# Patient Record
Sex: FEMALE | Race: WHITE | NOT HISPANIC OR LATINO | Employment: FULL TIME | URBAN - METROPOLITAN AREA
[De-identification: names, ages, dates, MRNs, and addresses within clinical notes are randomized per-mention and may not be internally consistent; named-entity substitution may affect disease eponyms.]

---

## 2023-04-20 DIAGNOSIS — Z91.89 AT RISK FOR DIABETES MELLITUS: Primary | ICD-10-CM

## 2023-04-21 RX ORDER — DULAGLUTIDE 0.75 MG/.5ML
INJECTION, SOLUTION SUBCUTANEOUS
Qty: 2 ML | Refills: 3 | Status: SHIPPED | OUTPATIENT
Start: 2023-04-21 | End: 2023-09-15 | Stop reason: DRUGHIGH

## 2023-09-14 ASSESSMENT — PROMIS GLOBAL HEALTH SCALE
RATE_GENERAL_HEALTH: GOOD
RATE_SOCIAL_SATISFACTION: GOOD
RATE_MENTAL_HEALTH: VERY GOOD
CARRYOUT_SOCIAL_ACTIVITIES: VERY GOOD
EMOTIONAL_PROBLEMS: RARELY
RATE_AVERAGE_PAIN: 1
RATE_AVERAGE_FATIGUE: MILD
CARRYOUT_PHYSICAL_ACTIVITIES: COMPLETELY
RATE_PHYSICAL_HEALTH: GOOD
RATE_QUALITY_OF_LIFE: VERY GOOD

## 2023-09-15 ENCOUNTER — OFFICE VISIT (OUTPATIENT)
Dept: PRIMARY CARE | Facility: CLINIC | Age: 58
End: 2023-09-15
Payer: COMMERCIAL

## 2023-09-15 VITALS
BODY MASS INDEX: 35.4 KG/M2 | HEART RATE: 73 BPM | WEIGHT: 239 LBS | SYSTOLIC BLOOD PRESSURE: 120 MMHG | DIASTOLIC BLOOD PRESSURE: 80 MMHG | OXYGEN SATURATION: 98 % | HEIGHT: 69 IN

## 2023-09-15 DIAGNOSIS — Z11.59 NEED FOR HEPATITIS C SCREENING TEST: ICD-10-CM

## 2023-09-15 DIAGNOSIS — M25.562 CHRONIC PAIN OF BOTH KNEES: ICD-10-CM

## 2023-09-15 DIAGNOSIS — Z00.00 ANNUAL PHYSICAL EXAM: Primary | ICD-10-CM

## 2023-09-15 DIAGNOSIS — G89.29 CHRONIC PAIN OF BOTH KNEES: ICD-10-CM

## 2023-09-15 DIAGNOSIS — E66.9 OBESITY (BMI 35.0-39.9 WITHOUT COMORBIDITY): ICD-10-CM

## 2023-09-15 DIAGNOSIS — Z91.89 AT RISK FOR DIABETES MELLITUS: ICD-10-CM

## 2023-09-15 DIAGNOSIS — K21.9 GASTROESOPHAGEAL REFLUX DISEASE WITHOUT ESOPHAGITIS: ICD-10-CM

## 2023-09-15 DIAGNOSIS — M25.561 CHRONIC PAIN OF BOTH KNEES: ICD-10-CM

## 2023-09-15 PROBLEM — R60.0 BILATERAL LEG EDEMA: Status: ACTIVE | Noted: 2023-09-15

## 2023-09-15 LAB
ALANINE AMINOTRANSFERASE (SGPT) (U/L) IN SER/PLAS: 21 U/L (ref 7–45)
ALBUMIN (G/DL) IN SER/PLAS: 4.3 G/DL (ref 3.4–5)
ALKALINE PHOSPHATASE (U/L) IN SER/PLAS: 80 U/L (ref 33–110)
ANION GAP IN SER/PLAS: 13 MMOL/L (ref 10–20)
ASPARTATE AMINOTRANSFERASE (SGOT) (U/L) IN SER/PLAS: 16 U/L (ref 9–39)
BILIRUBIN TOTAL (MG/DL) IN SER/PLAS: 0.6 MG/DL (ref 0–1.2)
CALCIUM (MG/DL) IN SER/PLAS: 9.7 MG/DL (ref 8.6–10.6)
CARBON DIOXIDE, TOTAL (MMOL/L) IN SER/PLAS: 30 MMOL/L (ref 21–32)
CHLORIDE (MMOL/L) IN SER/PLAS: 103 MMOL/L (ref 98–107)
CHOLESTEROL (MG/DL) IN SER/PLAS: 190 MG/DL (ref 0–199)
CHOLESTEROL IN HDL (MG/DL) IN SER/PLAS: 64.8 MG/DL
CHOLESTEROL/HDL RATIO: 2.9
CREATININE (MG/DL) IN SER/PLAS: 0.75 MG/DL (ref 0.5–1.05)
ERYTHROCYTE DISTRIBUTION WIDTH (RATIO) BY AUTOMATED COUNT: 14.2 % (ref 11.5–14.5)
ERYTHROCYTE MEAN CORPUSCULAR HEMOGLOBIN CONCENTRATION (G/DL) BY AUTOMATED: 31.2 G/DL (ref 32–36)
ERYTHROCYTE MEAN CORPUSCULAR VOLUME (FL) BY AUTOMATED COUNT: 96 FL (ref 80–100)
ERYTHROCYTES (10*6/UL) IN BLOOD BY AUTOMATED COUNT: 4.8 X10E12/L (ref 4–5.2)
ESTIMATED AVERAGE GLUCOSE FOR HBA1C: 123 MG/DL
GFR FEMALE: >90 ML/MIN/1.73M2
GLUCOSE (MG/DL) IN SER/PLAS: 117 MG/DL (ref 74–99)
HEMATOCRIT (%) IN BLOOD BY AUTOMATED COUNT: 45.9 % (ref 36–46)
HEMOGLOBIN (G/DL) IN BLOOD: 14.3 G/DL (ref 12–16)
HEMOGLOBIN A1C/HEMOGLOBIN TOTAL IN BLOOD: 5.9 %
HEPATITIS C VIRUS AB PRESENCE IN SERUM: NONREACTIVE
LDL: 111 MG/DL (ref 0–99)
LEUKOCYTES (10*3/UL) IN BLOOD BY AUTOMATED COUNT: 7.2 X10E9/L (ref 4.4–11.3)
NRBC (PER 100 WBCS) BY AUTOMATED COUNT: 0 /100 WBC (ref 0–0)
PLATELETS (10*3/UL) IN BLOOD AUTOMATED COUNT: 279 X10E9/L (ref 150–450)
POTASSIUM (MMOL/L) IN SER/PLAS: 5.2 MMOL/L (ref 3.5–5.3)
PROTEIN TOTAL: 7.4 G/DL (ref 6.4–8.2)
SODIUM (MMOL/L) IN SER/PLAS: 141 MMOL/L (ref 136–145)
THYROTROPIN (MIU/L) IN SER/PLAS BY DETECTION LIMIT <= 0.05 MIU/L: 2.01 MIU/L (ref 0.44–3.98)
TRIGLYCERIDE (MG/DL) IN SER/PLAS: 72 MG/DL (ref 0–149)
UREA NITROGEN (MG/DL) IN SER/PLAS: 13 MG/DL (ref 6–23)
VLDL: 14 MG/DL (ref 0–40)

## 2023-09-15 PROCEDURE — 80061 LIPID PANEL: CPT

## 2023-09-15 PROCEDURE — 80053 COMPREHEN METABOLIC PANEL: CPT

## 2023-09-15 PROCEDURE — 86803 HEPATITIS C AB TEST: CPT

## 2023-09-15 PROCEDURE — 83036 HEMOGLOBIN GLYCOSYLATED A1C: CPT

## 2023-09-15 PROCEDURE — 1036F TOBACCO NON-USER: CPT | Performed by: INTERNAL MEDICINE

## 2023-09-15 PROCEDURE — 99396 PREV VISIT EST AGE 40-64: CPT | Performed by: INTERNAL MEDICINE

## 2023-09-15 PROCEDURE — 84443 ASSAY THYROID STIM HORMONE: CPT

## 2023-09-15 PROCEDURE — 85027 COMPLETE CBC AUTOMATED: CPT

## 2023-09-15 RX ORDER — DICLOFENAC SODIUM 10 MG/G
4 GEL TOPICAL 2 TIMES DAILY PRN
COMMUNITY
Start: 2019-12-18 | End: 2023-09-15 | Stop reason: SDUPTHER

## 2023-09-15 RX ORDER — MELOXICAM 15 MG/1
7.5-15 TABLET ORAL DAILY PRN
Qty: 60 TABLET | Refills: 1 | Status: SHIPPED | OUTPATIENT
Start: 2023-09-15

## 2023-09-15 RX ORDER — FAMOTIDINE 20 MG/1
1 TABLET, FILM COATED ORAL NIGHTLY
COMMUNITY
Start: 2021-12-02 | End: 2023-09-15 | Stop reason: SDUPTHER

## 2023-09-15 RX ORDER — MELOXICAM 15 MG/1
1 TABLET ORAL DAILY
COMMUNITY
Start: 2019-03-18 | End: 2023-09-15 | Stop reason: SDUPTHER

## 2023-09-15 RX ORDER — DICLOFENAC SODIUM 10 MG/G
4 GEL TOPICAL 2 TIMES DAILY PRN
Qty: 1 G | Refills: 2 | Status: SHIPPED | OUTPATIENT
Start: 2023-09-15

## 2023-09-15 RX ORDER — DULAGLUTIDE 1.5 MG/.5ML
1.5 INJECTION, SOLUTION SUBCUTANEOUS
Qty: 2 ML | Refills: 2 | Status: SHIPPED | OUTPATIENT
Start: 2023-09-15 | End: 2024-01-09 | Stop reason: CLARIF

## 2023-09-15 RX ORDER — PANTOPRAZOLE SODIUM 20 MG/1
1 TABLET, DELAYED RELEASE ORAL DAILY
COMMUNITY
Start: 2021-01-22 | End: 2023-09-15 | Stop reason: SDUPTHER

## 2023-09-15 RX ORDER — FAMOTIDINE 20 MG/1
20 TABLET, FILM COATED ORAL NIGHTLY
Qty: 90 TABLET | Refills: 1 | Status: SHIPPED | OUTPATIENT
Start: 2023-09-15

## 2023-09-15 RX ORDER — PANTOPRAZOLE SODIUM 20 MG/1
20 TABLET, DELAYED RELEASE ORAL DAILY
Qty: 90 TABLET | Refills: 1 | Status: SHIPPED | OUTPATIENT
Start: 2023-09-15 | End: 2023-09-26 | Stop reason: DRUGHIGH

## 2023-09-15 ASSESSMENT — PATIENT HEALTH QUESTIONNAIRE - PHQ9
SUM OF ALL RESPONSES TO PHQ9 QUESTIONS 1 AND 2: 0
1. LITTLE INTEREST OR PLEASURE IN DOING THINGS: NOT AT ALL
1. LITTLE INTEREST OR PLEASURE IN DOING THINGS: NOT AT ALL
SUM OF ALL RESPONSES TO PHQ9 QUESTIONS 1 AND 2: 0
2. FEELING DOWN, DEPRESSED OR HOPELESS: NOT AT ALL
2. FEELING DOWN, DEPRESSED OR HOPELESS: NOT AT ALL

## 2023-09-15 NOTE — PROGRESS NOTES
"Subjective   Patient ID: Marlena Strickland is a 57 y.o. female who presents for Annual Exam (Patient here for complete physical exam).    HPI   Concerned about excess weight, Trulicity was effective, discontinued due to cost, interested in resuming the regimen in a higher dose weekly.  Review of Systems  Weight gain  Objective   Pulse 73   Ht 1.753 m (5' 9\")   Wt 108 kg (239 lb)   SpO2 98%   BMI 35.29 kg/m²     Physical Exam  NAD. Cooperative.  HEENT: WNL  Neck: WNL  Lungs CTA  Heart: RRR  Abdomen: WNL  Musculoskeletal system: WNL  Neurologic exam: WNL    Assessment/Plan   Diagnoses and all orders for this visit:  Annual physical exam  -     Hemoglobin A1C; Future  -     Lipid panel; Future  -     TSH; Future  -     Comprehensive metabolic panel; Future  -     CBC; Future  Need for hepatitis C screening test  -     Hepatitis C Antibody; Future  Obesity (BMI 35.0-39.9 without comorbidity)  -     Follow Up In [facility] Pharmacy; Future  -     dulaglutide (Trulicity) 1.5 mg/0.5 mL pen injector injection; Inject 1.5 mg under the skin 1 (one) time per week.  Gastroesophageal reflux disease without esophagitis  -     famotidine (Pepcid) 20 mg tablet; Take 1 tablet (20 mg) by mouth once daily at bedtime.  -     pantoprazole (ProtoNix) 20 mg EC tablet; Take 1 tablet (20 mg) by mouth once daily.  Chronic pain of both knees  -     diclofenac sodium (Voltaren) 1 % gel gel; Apply 1 Application topically 2 times a day as needed (.).  -     meloxicam (Mobic) 15 mg tablet; Take 0.5-1 tablets (7.5-15 mg) by mouth once daily as needed for moderate pain (4 - 6).  At risk for diabetes mellitus  -     dulaglutide (Trulicity) 1.5 mg/0.5 mL pen injector injection; Inject 1.5 mg under the skin 1 (one) time per week.         "

## 2023-09-22 ENCOUNTER — TELEPHONE (OUTPATIENT)
Dept: PRIMARY CARE | Facility: CLINIC | Age: 58
End: 2023-09-22
Payer: COMMERCIAL

## 2023-09-22 DIAGNOSIS — K21.9 GASTROESOPHAGEAL REFLUX DISEASE WITHOUT ESOPHAGITIS: Primary | ICD-10-CM

## 2023-09-26 RX ORDER — PANTOPRAZOLE SODIUM 40 MG/1
40 TABLET, DELAYED RELEASE ORAL DAILY
Qty: 30 TABLET | Refills: 1 | Status: SHIPPED | OUTPATIENT
Start: 2023-09-26 | End: 2023-09-27 | Stop reason: DRUGHIGH

## 2023-09-27 RX ORDER — PANTOPRAZOLE SODIUM 20 MG/1
20 TABLET, DELAYED RELEASE ORAL DAILY
Qty: 90 TABLET | Refills: 1 | Status: SHIPPED | OUTPATIENT
Start: 2023-09-27

## 2023-09-29 ENCOUNTER — TELEMEDICINE (OUTPATIENT)
Dept: PHARMACY | Facility: HOSPITAL | Age: 58
End: 2023-09-29
Payer: COMMERCIAL

## 2023-09-29 DIAGNOSIS — E66.9 OBESITY (BMI 35.0-39.9 WITHOUT COMORBIDITY): Primary | ICD-10-CM

## 2023-09-29 NOTE — PROGRESS NOTES
Subjective   Patient ID: Marlena Strickland is a 57 y.o. female who presents for Obesity.    Referring Provider: Meg Perez MD     HPI  Marlena had taken Trulicity 0.75 mg weekly for a year, but ran out of refills just before her PCP visit. She had not had a dose for 2 weeks. At visit, Trulicity was increased to 1.5 mg. She experienced slight diarrhea after the first dose.  After the second dose she experienced diarrhea starting on Wednesday for 3 days. She takes Trulicity on Sundays.    Objective     There were no vitals taken for this visit.     Labs  Lab Results   Component Value Date    BILITOT 0.6 09/15/2023    CALCIUM 9.7 09/15/2023    CO2 30 09/15/2023     09/15/2023    CREATININE 0.75 09/15/2023    GLUCOSE 117 (H) 09/15/2023    ALKPHOS 80 09/15/2023    K 5.2 09/15/2023    PROT 7.4 09/15/2023     09/15/2023    AST 16 09/15/2023    ALT 21 09/15/2023    BUN 13 09/15/2023    ANIONGAP 13 09/15/2023    ALBUMIN 4.3 09/15/2023    GFRF >90 09/15/2023     Lab Results   Component Value Date    TRIG 72 09/15/2023    CHOL 190 09/15/2023    HDL 64.8 09/15/2023     Lab Results   Component Value Date    HGBA1C 5.9 (A) 09/15/2023       Current Outpatient Medications on File Prior to Visit   Medication Sig Dispense Refill    diclofenac sodium (Voltaren) 1 % gel gel Apply 1 Application topically 2 times a day as needed (.). 1 g 2    dulaglutide (Trulicity) 1.5 mg/0.5 mL pen injector injection Inject 1.5 mg under the skin 1 (one) time per week. 2 mL 2    famotidine (Pepcid) 20 mg tablet Take 1 tablet (20 mg) by mouth once daily at bedtime. 90 tablet 1    meloxicam (Mobic) 15 mg tablet Take 0.5-1 tablets (7.5-15 mg) by mouth once daily as needed for moderate pain (4 - 6). 60 tablet 1    pantoprazole (ProtoNix) 20 mg EC tablet Take 1 tablet (20 mg) by mouth once daily. 90 tablet 1    [DISCONTINUED] pantoprazole (ProtoNix) 20 mg EC tablet Take 1 tablet (20 mg) by mouth once daily. 90 tablet 1    [DISCONTINUED]  pantoprazole (ProtoNix) 40 mg EC tablet Take 1 tablet (40 mg) by mouth once daily. Do not crush, chew, or split. 30 tablet 1     No current facility-administered medications on file prior to visit.        Assessment/Plan   Problem List Items Addressed This Visit             ICD-10-CM    Obesity (BMI 35.0-39.9 without comorbidity) E66.9     If diarrhea has resolved today, then Marlena will take a 3rd dose of Trulicity 1.5 mg. She will call me next week with an update.  If dose is tolerated, we will go up to 3 mg.  If dose is not tolerated, we may go back down to 0.75 mg for a short time and try titrating again.            Saray Lewis, PharmD    Continue all meds under the continuation of care with the referring provider and clinical pharmacy team.    Verbal consent to manage patient's drug therapy was obtained from the patient. They were informed they may decline to participate or withdraw from participation in pharmacy services at any time.

## 2023-09-29 NOTE — ASSESSMENT & PLAN NOTE
If diarrhea has resolved today, then Marlena will take a 3rd dose of Trulicity 1.5 mg. She will call me next week with an update.  If dose is tolerated, we will go up to 3 mg.  If dose is not tolerated, we may go back down to 0.75 mg for a short time and try titrating again.

## 2023-10-02 ENCOUNTER — TELEPHONE (OUTPATIENT)
Dept: PHARMACY | Facility: HOSPITAL | Age: 58
End: 2023-10-02
Payer: COMMERCIAL

## 2023-10-02 DIAGNOSIS — E66.9 OBESITY (BMI 35.0-39.9 WITHOUT COMORBIDITY): Primary | ICD-10-CM

## 2023-10-02 RX ORDER — DULAGLUTIDE 0.75 MG/.5ML
0.75 INJECTION, SOLUTION SUBCUTANEOUS
Qty: 2 ML | Refills: 2 | Status: SHIPPED | OUTPATIENT
Start: 2023-10-02 | End: 2024-01-09 | Stop reason: CLARIF

## 2023-10-02 NOTE — TELEPHONE ENCOUNTER
Marlena called to report side effects from Trulicity 1.5 mg have cleared up and she did not take a 3rd dose yesterday. Plan is to restart Trulicity at 0.75 mg with first dose this Sunday, 10/8/23. Prescription sent to [facility] on file. After 4 doses of 0.75, Marlena will go up to 1.5 mg (has 2 pens left from this first fill). We will plan for a follow-up phone visit on 11/14/23 at 9am. She will call if any issues arise before then.

## 2023-11-14 ENCOUNTER — TELEMEDICINE (OUTPATIENT)
Dept: PHARMACY | Facility: HOSPITAL | Age: 58
End: 2023-11-14
Payer: COMMERCIAL

## 2023-11-14 DIAGNOSIS — E66.9 OBESITY (BMI 35.0-39.9 WITHOUT COMORBIDITY): ICD-10-CM

## 2023-11-14 NOTE — PROGRESS NOTES
Subjective   Patient ID: Marlena Strickland is a 58 y.o. female who presents for Obesity.    Referring Provider: Meg Perez MD     HPI  Marlena had taken Trulicity 0.75 mg weekly for a year, but ran out of refills just before her PCP visit in September. She had not had a dose for 2 weeks. At visit, Trulicity was increased to 1.5 mg. She experienced slight diarrhea after the first and second dose. She went back to the 0.75 mg dose and had been tolerating well. Then she caught COVID and stopped taking Trulicity for a week and a half. She started the Trulicity 0.75 mg again on 11/12. No issues with the 0.75 mg.     She takes Trulicity on Sundays.    Objective     There were no vitals taken for this visit.     Labs  Lab Results   Component Value Date    BILITOT 0.6 09/15/2023    CALCIUM 9.7 09/15/2023    CO2 30 09/15/2023     09/15/2023    CREATININE 0.75 09/15/2023    GLUCOSE 117 (H) 09/15/2023    ALKPHOS 80 09/15/2023    K 5.2 09/15/2023    PROT 7.4 09/15/2023     09/15/2023    AST 16 09/15/2023    ALT 21 09/15/2023    BUN 13 09/15/2023    ANIONGAP 13 09/15/2023    ALBUMIN 4.3 09/15/2023    GFRF >90 09/15/2023     Lab Results   Component Value Date    TRIG 72 09/15/2023    CHOL 190 09/15/2023    HDL 64.8 09/15/2023     Lab Results   Component Value Date    HGBA1C 5.9 (A) 09/15/2023       Current Outpatient Medications on File Prior to Visit   Medication Sig Dispense Refill    diclofenac sodium (Voltaren) 1 % gel gel Apply 1 Application topically 2 times a day as needed (.). 1 g 2    dulaglutide (Trulicity) 0.75 mg/0.5 mL pen injector Inject 0.75 mg under the skin 1 (one) time per week. 2 mL 2    dulaglutide (Trulicity) 1.5 mg/0.5 mL pen injector injection Inject 1.5 mg under the skin 1 (one) time per week. 2 mL 2    famotidine (Pepcid) 20 mg tablet Take 1 tablet (20 mg) by mouth once daily at bedtime. 90 tablet 1    meloxicam (Mobic) 15 mg tablet Take 0.5-1 tablets (7.5-15 mg) by mouth once daily  as needed for moderate pain (4 - 6). 60 tablet 1    pantoprazole (ProtoNix) 20 mg EC tablet Take 1 tablet (20 mg) by mouth once daily. 90 tablet 1     No current facility-administered medications on file prior to visit.        Assessment/Plan   Problem List Items Addressed This Visit             ICD-10-CM    Obesity (BMI 35.0-39.9 without comorbidity) E66.9     Continue Trulicity 0.75 mg for 3 more doses (end 12/3/23).  Start Trulicity 1.5 mg on 12/10/23. She still has 2 pens from a fill a couple months ago and refills at the pharmacy.  Follow-up: 1/9/24 at 9 am.          Saray Lewis PharmD    Continue all meds under the continuation of care with the referring provider and clinical pharmacy team.    Verbal consent to manage patient's drug therapy was obtained from the patient. They were informed they may decline to participate or withdraw from participation in pharmacy services at any time.

## 2023-11-14 NOTE — ASSESSMENT & PLAN NOTE
Continue Trulicity 0.75 mg for 3 more doses (end 12/3/23).  Start Trulicity 1.5 mg on 12/10/23. She still has 2 pens from a fill a couple months ago and refills at the pharmacy.  Follow-up: 1/9/24 at 9 am.

## 2023-11-17 ENCOUNTER — TELEMEDICINE (OUTPATIENT)
Dept: PRIMARY CARE | Facility: CLINIC | Age: 58
End: 2023-11-17
Payer: COMMERCIAL

## 2023-11-17 DIAGNOSIS — R05.1 ACUTE COUGH: Primary | ICD-10-CM

## 2023-11-17 PROCEDURE — 99213 OFFICE O/P EST LOW 20 MIN: CPT

## 2023-11-17 RX ORDER — ALBUTEROL SULFATE 90 UG/1
2 AEROSOL, METERED RESPIRATORY (INHALATION) EVERY 4 HOURS PRN
Qty: 6.7 G | Refills: 0 | Status: SHIPPED | OUTPATIENT
Start: 2023-11-17 | End: 2024-11-16

## 2023-11-17 RX ORDER — BENZONATATE 100 MG/1
100 CAPSULE ORAL 3 TIMES DAILY PRN
Qty: 42 CAPSULE | Refills: 0 | Status: SHIPPED | OUTPATIENT
Start: 2023-11-17 | End: 2023-12-17

## 2023-11-17 NOTE — PROGRESS NOTES
"Subjective   Patient ID: Concetta Strickland \"Audelia" is a 58 y.o. female who presents for Cough (On demand virtual care visit).  HPI  Concetta presents for on demand virtual care visit for on going cough that started 11/13/23.  Had COVID 3 weeks ago, recovered was good for a week.   This started again with cough, congestion the past week.   Coughing brains out. Cough is not productive. No trouble breathing, mild shortness of breath with the cough.  A little bit of chills Tuesday/Wednesday.   No runny nose. Fullness in her ears. Pseudophed helps.   No sore throat.     Past Surgical History:   Procedure Laterality Date    OTHER SURGICAL HISTORY  12/17/2019    No history of surgery      Past Medical History:   Diagnosis Date    Hyperlipidemia, unspecified 11/24/2013    Hyperlipidemia     Social History     Tobacco Use    Smoking status: Never    Smokeless tobacco: Never   Vaping Use    Vaping Use: Never used   Substance Use Topics    Alcohol use: Yes    Drug use: Never        Review of Systems  10 point review of systems performed and is negative except as noted in the HPI.      Current Outpatient Medications:     diclofenac sodium (Voltaren) 1 % gel gel, Apply 1 Application topically 2 times a day as needed (.)., Disp: 1 g, Rfl: 2    dulaglutide (Trulicity) 0.75 mg/0.5 mL pen injector, Inject 0.75 mg under the skin 1 (one) time per week., Disp: 2 mL, Rfl: 2    famotidine (Pepcid) 20 mg tablet, Take 1 tablet (20 mg) by mouth once daily at bedtime., Disp: 90 tablet, Rfl: 1    meloxicam (Mobic) 15 mg tablet, Take 0.5-1 tablets (7.5-15 mg) by mouth once daily as needed for moderate pain (4 - 6)., Disp: 60 tablet, Rfl: 1    pantoprazole (ProtoNix) 20 mg EC tablet, Take 1 tablet (20 mg) by mouth once daily., Disp: 90 tablet, Rfl: 1    albuterol (Proventil HFA) 90 mcg/actuation inhaler, Inhale 2 puffs every 4 hours if needed for wheezing or shortness of breath., Disp: 6.7 g, Rfl: 0    benzonatate (Tessalon) 100 mg capsule, Take " 1 capsule (100 mg) by mouth 3 times a day as needed for cough. Do not crush or chew., Disp: 42 capsule, Rfl: 0    dulaglutide (Trulicity) 1.5 mg/0.5 mL pen injector injection, Inject 1.5 mg under the skin 1 (one) time per week., Disp: 2 mL, Rfl: 2     Objective   There were no vitals taken for this visit.    Physical Exam - virtual    Assessment/Plan   Problem List Items Addressed This Visit    None  Visit Diagnoses       Acute cough    -  Primary    Relevant Medications    benzonatate (Tessalon) 100 mg capsule    albuterol (Proventil HFA) 90 mcg/actuation inhaler        Cough  Likely viral, use benzonatate as needed   Use albuterol as needed for shortness of breath with the cough  Discussed if not improving over the weekend/ by early next week recommend she follow up with her PCP, urgent care, or do another virtual visit  Also recommend humidifier at night   If worsening please call your PCP, go to urgent care, or the ER    Discussed at visit any disease processes that were of concern as well as the risks, benefits and instructions on any new medication provided. Patient (and/or caretaker of patient if present) stated all questions were answered, and they voiced understanding of instructions.      A video visit between the patient (at the originating site) and the provider (at the distant site) was utilized to provide this telehealth service.     Verbal consent was requested and obtained from the patient on this date for a telehealth visit. The patient was informed about the telehealth clinical encounter including benefits to avoiding travel, limitations to the assessment, and billing for the service. In person care may be recommended if needed. Telehealth sessions are not being recorded and personal health information is protected. All questions were answered and verbal informal consent was obtained from the patient to proceed.     Total time spent on phone with patient: 5 minutes  Total time spent documentin  minutes

## 2024-01-09 ENCOUNTER — TELEMEDICINE (OUTPATIENT)
Dept: PHARMACY | Facility: HOSPITAL | Age: 59
End: 2024-01-09
Payer: COMMERCIAL

## 2024-01-09 DIAGNOSIS — E66.9 OBESITY (BMI 35.0-39.9 WITHOUT COMORBIDITY): ICD-10-CM

## 2024-01-09 RX ORDER — SEMAGLUTIDE 1 MG/.5ML
1 INJECTION, SOLUTION SUBCUTANEOUS
Qty: 2 ML | Refills: 2 | Status: SHIPPED | OUTPATIENT
Start: 2024-01-09 | End: 2024-04-10 | Stop reason: DRUGHIGH

## 2024-01-09 NOTE — PROGRESS NOTES
Subjective   Patient ID: Marlena Strickland is a 58 y.o. female who presents for Obesity.    Referring Provider: Meg Perez MD     HPI  Marlena had increased Trulicity to 1.5 mg weekly since our last visit. She received a notice from her insurance saying Trulicity will require a prior authorization after 1/1/24. She filled another month's worth of Trulicity before the end of the year and has 5 doses left. She takes Trulicity on Sundays. Trulicity has been well tolerated other than loose stools. She has lost ~5 lbs since increasing the dose.    Objective     There were no vitals taken for this visit.     Labs  Lab Results   Component Value Date    BILITOT 0.6 09/15/2023    CALCIUM 9.7 09/15/2023    CO2 30 09/15/2023     09/15/2023    CREATININE 0.75 09/15/2023    GLUCOSE 117 (H) 09/15/2023    ALKPHOS 80 09/15/2023    K 5.2 09/15/2023    PROT 7.4 09/15/2023     09/15/2023    AST 16 09/15/2023    ALT 21 09/15/2023    BUN 13 09/15/2023    ANIONGAP 13 09/15/2023    ALBUMIN 4.3 09/15/2023    GFRF >90 09/15/2023     Lab Results   Component Value Date    TRIG 72 09/15/2023    CHOL 190 09/15/2023    HDL 64.8 09/15/2023     Lab Results   Component Value Date    HGBA1C 5.9 (A) 09/15/2023       Current Outpatient Medications on File Prior to Visit   Medication Sig Dispense Refill    albuterol (Proventil HFA) 90 mcg/actuation inhaler Inhale 2 puffs every 4 hours if needed for wheezing or shortness of breath. 6.7 g 0    diclofenac sodium (Voltaren) 1 % gel gel Apply 1 Application topically 2 times a day as needed (.). 1 g 2    famotidine (Pepcid) 20 mg tablet Take 1 tablet (20 mg) by mouth once daily at bedtime. 90 tablet 1    meloxicam (Mobic) 15 mg tablet Take 0.5-1 tablets (7.5-15 mg) by mouth once daily as needed for moderate pain (4 - 6). 60 tablet 1    pantoprazole (ProtoNix) 20 mg EC tablet Take 1 tablet (20 mg) by mouth once daily. 90 tablet 1    [DISCONTINUED] dulaglutide (Trulicity) 0.75 mg/0.5 mL  pen injector Inject 0.75 mg under the skin 1 (one) time per week. 2 mL 2    [DISCONTINUED] dulaglutide (Trulicity) 1.5 mg/0.5 mL pen injector injection Inject 1.5 mg under the skin 1 (one) time per week. 2 mL 2     No current facility-administered medications on file prior to visit.        Assessment/Plan   Problem List Items Addressed This Visit             ICD-10-CM    Obesity (BMI 35.0-39.9 without comorbidity) E66.9     Due to insurance requirement changes, we will change from Trulicity to Wegovy. Marlena would like to finish her supply of Trulicity so there is ample time to complete the Wegovy prior authorization.  -Start Wegovy 1 mg weekly injection (due to supply constraints) on 2/11/24. Prescription sent to  [facility] for pickup.         Relevant Medications    semaglutide, weight loss, (Wegovy) 1 mg/0.5 mL pen injector    Other Relevant Orders    Follow Up In [facility] Pharmacy     Saray Lewis PharmD    Continue all meds under the continuation of care with the referring provider and clinical pharmacy team.    Verbal consent to manage patient's drug therapy was obtained from the patient. They were informed they may decline to participate or withdraw from participation in pharmacy services at any time.

## 2024-01-09 NOTE — ASSESSMENT & PLAN NOTE
Due to insurance requirement changes, we will change from Trulicity to Wegovy. Marlena would like to finish her supply of Trulicity so there is ample time to complete the Wegovy prior authorization.  -Start Wegovy 1 mg weekly injection (due to supply constraints) on 2/11/24. Prescription sent to [facility] for pickup.

## 2024-02-14 PROCEDURE — RXMED WILLOW AMBULATORY MEDICATION CHARGE

## 2024-02-16 ENCOUNTER — PHARMACY VISIT (OUTPATIENT)
Dept: PHARMACY | Facility: CLINIC | Age: 59
End: 2024-02-16
Payer: MEDICARE

## 2024-02-28 ENCOUNTER — TELEMEDICINE (OUTPATIENT)
Dept: PHARMACY | Facility: HOSPITAL | Age: 59
End: 2024-02-28
Payer: COMMERCIAL

## 2024-02-28 DIAGNOSIS — E66.9 OBESITY (BMI 35.0-39.9 WITHOUT COMORBIDITY): ICD-10-CM

## 2024-02-28 NOTE — PROGRESS NOTES
Subjective   Patient ID: Marlena Strickland is a 58 y.o. female who presents for Obesity.    Referring Provider: Meg Perez MD     HPI  Marlena started taking Wegovy 1 mg on 2/18 and is 2 doses in. She has noticed significant appetite suppression and some nausea, but medication is still tolerable. She has lost ~5 lbs since starting Wegovy, but that is partially due to being sick and not eating or drinking as much.    Objective     There were no vitals taken for this visit.     Labs  Lab Results   Component Value Date    BILITOT 0.6 09/15/2023    CALCIUM 9.7 09/15/2023    CO2 30 09/15/2023     09/15/2023    CREATININE 0.75 09/15/2023    GLUCOSE 117 (H) 09/15/2023    ALKPHOS 80 09/15/2023    K 5.2 09/15/2023    PROT 7.4 09/15/2023     09/15/2023    AST 16 09/15/2023    ALT 21 09/15/2023    BUN 13 09/15/2023    ANIONGAP 13 09/15/2023    ALBUMIN 4.3 09/15/2023    GFRF >90 09/15/2023     Lab Results   Component Value Date    TRIG 72 09/15/2023    CHOL 190 09/15/2023    HDL 64.8 09/15/2023     Lab Results   Component Value Date    HGBA1C 5.9 (A) 09/15/2023       Current Outpatient Medications on File Prior to Visit   Medication Sig Dispense Refill    albuterol (Proventil HFA) 90 mcg/actuation inhaler Inhale 2 puffs every 4 hours if needed for wheezing or shortness of breath. 6.7 g 0    diclofenac sodium (Voltaren) 1 % gel gel Apply 1 Application topically 2 times a day as needed (.). 1 g 2    famotidine (Pepcid) 20 mg tablet Take 1 tablet (20 mg) by mouth once daily at bedtime. 90 tablet 1    meloxicam (Mobic) 15 mg tablet Take 0.5-1 tablets (7.5-15 mg) by mouth once daily as needed for moderate pain (4 - 6). 60 tablet 1    pantoprazole (ProtoNix) 20 mg EC tablet Take 1 tablet (20 mg) by mouth once daily. 90 tablet 1    semaglutide, weight loss, (Wegovy) 1 mg/0.5 mL pen injector Inject 1 mg under the skin every 7 days. 2 mL 2     No current facility-administered medications on file prior to visit.         Assessment/Plan   Problem List Items Addressed This Visit             ICD-10-CM    Obesity (BMI 35.0-39.9 without comorbidity) E66.9     Continue Wegovy 1 mg weekly (Sunday)  Follow-up: 4/10/24          Saray Lewis, PharmD    Continue all meds under the continuation of care with the referring provider and clinical pharmacy team.    Verbal consent to manage patient's drug therapy was obtained from the patient. They were informed they may decline to participate or withdraw from participation in pharmacy services at any time.

## 2024-03-15 PROCEDURE — RXMED WILLOW AMBULATORY MEDICATION CHARGE

## 2024-03-16 ENCOUNTER — PHARMACY VISIT (OUTPATIENT)
Dept: PHARMACY | Facility: CLINIC | Age: 59
End: 2024-03-16
Payer: MEDICARE

## 2024-04-10 ENCOUNTER — TELEMEDICINE (OUTPATIENT)
Dept: PHARMACY | Facility: HOSPITAL | Age: 59
End: 2024-04-10
Payer: COMMERCIAL

## 2024-04-10 DIAGNOSIS — E66.9 OBESITY (BMI 35.0-39.9 WITHOUT COMORBIDITY): ICD-10-CM

## 2024-04-10 PROCEDURE — RXMED WILLOW AMBULATORY MEDICATION CHARGE

## 2024-04-10 RX ORDER — SEMAGLUTIDE 1.7 MG/.75ML
1.7 INJECTION, SOLUTION SUBCUTANEOUS
Qty: 3 ML | Refills: 1 | Status: SHIPPED | OUTPATIENT
Start: 2024-04-10 | End: 2024-06-07 | Stop reason: SDUPTHER

## 2024-04-10 NOTE — PROGRESS NOTES
Subjective   Patient ID: Marlena Strickland is a 58 y.o. female who presents for Obesity.    Referring Provider: Meg Perez MD     HPI  Marlena has taken 6 doses of Wegovy 1 mg and no longer has nausea or any other side effects. She takes Wegovy on Fridays.    Objective     There were no vitals taken for this visit.     Labs  Lab Results   Component Value Date    BILITOT 0.6 09/15/2023    CALCIUM 9.7 09/15/2023    CO2 30 09/15/2023     09/15/2023    CREATININE 0.75 09/15/2023    GLUCOSE 117 (H) 09/15/2023    ALKPHOS 80 09/15/2023    K 5.2 09/15/2023    PROT 7.4 09/15/2023     09/15/2023    AST 16 09/15/2023    ALT 21 09/15/2023    BUN 13 09/15/2023    ANIONGAP 13 09/15/2023    ALBUMIN 4.3 09/15/2023    GFRF >90 09/15/2023     Lab Results   Component Value Date    TRIG 72 09/15/2023    CHOL 190 09/15/2023    HDL 64.8 09/15/2023     Lab Results   Component Value Date    HGBA1C 5.9 (A) 09/15/2023       Current Outpatient Medications on File Prior to Visit   Medication Sig Dispense Refill    albuterol (Proventil HFA) 90 mcg/actuation inhaler Inhale 2 puffs every 4 hours if needed for wheezing or shortness of breath. 6.7 g 0    diclofenac sodium (Voltaren) 1 % gel gel Apply 1 Application topically 2 times a day as needed (.). 1 g 2    famotidine (Pepcid) 20 mg tablet Take 1 tablet (20 mg) by mouth once daily at bedtime. 90 tablet 1    meloxicam (Mobic) 15 mg tablet Take 0.5-1 tablets (7.5-15 mg) by mouth once daily as needed for moderate pain (4 - 6). 60 tablet 1    pantoprazole (ProtoNix) 20 mg EC tablet Take 1 tablet (20 mg) by mouth once daily. 90 tablet 1    [DISCONTINUED] semaglutide, weight loss, (Wegovy) 1 mg/0.5 mL pen injector Inject 1 mg under the skin every 7 days. 2 mL 2     No current facility-administered medications on file prior to visit.        Assessment/Plan   Problem List Items Addressed This Visit             ICD-10-CM    Obesity (BMI 35.0-39.9 without comorbidity) E66.9      Increase Wegovy from 1 mg weekly to 1.7 mg weekly (Friday)  Follow-up: 5/22/24         Relevant Medications    semaglutide, weight loss, (Wegovy) 1.7 mg/0.75 mL pen injector    Other Relevant Orders    Follow Up In [facility] - Pharmacy     Tarik HassanD    Continue all meds under the continuation of care with the referring provider and clinical pharmacy team.    Verbal consent to manage patient's drug therapy was obtained from the patient. They were informed they may decline to participate or withdraw from participation in pharmacy services at any time.

## 2024-04-19 ENCOUNTER — PHARMACY VISIT (OUTPATIENT)
Dept: PHARMACY | Facility: CLINIC | Age: 59
End: 2024-04-19
Payer: MEDICARE

## 2024-05-14 PROCEDURE — RXMED WILLOW AMBULATORY MEDICATION CHARGE

## 2024-05-17 ENCOUNTER — PHARMACY VISIT (OUTPATIENT)
Dept: PHARMACY | Facility: CLINIC | Age: 59
End: 2024-05-17
Payer: MEDICARE

## 2024-05-22 ENCOUNTER — TELEMEDICINE (OUTPATIENT)
Dept: PHARMACY | Facility: HOSPITAL | Age: 59
End: 2024-05-22
Payer: COMMERCIAL

## 2024-05-22 DIAGNOSIS — E66.9 OBESITY (BMI 35.0-39.9 WITHOUT COMORBIDITY): ICD-10-CM

## 2024-05-22 NOTE — PROGRESS NOTES
Subjective   Patient ID: Marlena Strickland is a 58 y.o. female who presents for Weight Loss.    Referring Provider: Meg Perez MD     HPI  Marlena has taken 5 doses of Wegovy 1.7 mg. She experienced moderate nausea after the first 2-3 doses. It has become much less noticeable, but she is uncertain whether to continue at current dose or increase to the 2.4 mg strength.    She has lost about 11 lbs since she started Wegovy.    Objective     There were no vitals taken for this visit.     Labs  Lab Results   Component Value Date    BILITOT 0.6 09/15/2023    CALCIUM 9.7 09/15/2023    CO2 30 09/15/2023     09/15/2023    CREATININE 0.75 09/15/2023    GLUCOSE 117 (H) 09/15/2023    ALKPHOS 80 09/15/2023    K 5.2 09/15/2023    PROT 7.4 09/15/2023     09/15/2023    AST 16 09/15/2023    ALT 21 09/15/2023    BUN 13 09/15/2023    ANIONGAP 13 09/15/2023    ALBUMIN 4.3 09/15/2023    GFRF >90 09/15/2023     Lab Results   Component Value Date    TRIG 72 09/15/2023    CHOL 190 09/15/2023    HDL 64.8 09/15/2023     Lab Results   Component Value Date    HGBA1C 5.9 (A) 09/15/2023       Current Outpatient Medications on File Prior to Visit   Medication Sig Dispense Refill    albuterol (Proventil HFA) 90 mcg/actuation inhaler Inhale 2 puffs every 4 hours if needed for wheezing or shortness of breath. 6.7 g 0    diclofenac sodium (Voltaren) 1 % gel gel Apply 1 Application topically 2 times a day as needed (.). 1 g 2    famotidine (Pepcid) 20 mg tablet Take 1 tablet (20 mg) by mouth once daily at bedtime. 90 tablet 1    meloxicam (Mobic) 15 mg tablet Take 0.5-1 tablets (7.5-15 mg) by mouth once daily as needed for moderate pain (4 - 6). 60 tablet 1    pantoprazole (ProtoNix) 20 mg EC tablet Take 1 tablet (20 mg) by mouth once daily. 90 tablet 1    semaglutide, weight loss, (Wegovy) 1.7 mg/0.75 mL pen injector Inject 1.7 mg under the skin every 7 days. 3 mL 1     No current facility-administered medications on file prior  to visit.        Assessment/Plan   Problem List Items Addressed This Visit             ICD-10-CM    Obesity (BMI 35.0-39.9 without comorbidity) E66.9     Continue Wegovy 1.7 mg weekly (Friday). Patient will contact me in a couple weeks when she has decided on dose for next refill.  Follow-up: 2-3 weeks          Saray Lewis PharmD    Continue all meds under the continuation of care with the referring provider and clinical pharmacy team.    Verbal consent to manage patient's drug therapy was obtained from the patient. They were informed they may decline to participate or withdraw from participation in pharmacy services at any time.

## 2024-05-22 NOTE — ASSESSMENT & PLAN NOTE
Continue Wegovy 1.7 mg weekly (Friday). Patient will contact me in a couple weeks when she has decided on dose for next refill.  Follow-up: 2-3 weeks

## 2024-06-07 DIAGNOSIS — E66.9 OBESITY (BMI 35.0-39.9 WITHOUT COMORBIDITY): ICD-10-CM

## 2024-06-07 PROCEDURE — RXMED WILLOW AMBULATORY MEDICATION CHARGE

## 2024-06-07 RX ORDER — SEMAGLUTIDE 1.7 MG/.75ML
1.7 INJECTION, SOLUTION SUBCUTANEOUS
Qty: 3 ML | Refills: 2 | Status: SHIPPED | OUTPATIENT
Start: 2024-06-07

## 2024-06-07 NOTE — TELEPHONE ENCOUNTER
Received voicemail from patient requesting refills of Wegovy 1.7 mg as she feels she is losing weight on current dose and does not need to increase at this time. She will call again if increased dose is needed. New Rx for Wegovy 1.7 mg has been sent.    Saray Lewis, PharmD

## 2024-06-14 ENCOUNTER — PHARMACY VISIT (OUTPATIENT)
Dept: PHARMACY | Facility: CLINIC | Age: 59
End: 2024-06-14
Payer: MEDICARE

## 2024-07-11 PROCEDURE — RXMED WILLOW AMBULATORY MEDICATION CHARGE

## 2024-07-13 ENCOUNTER — PHARMACY VISIT (OUTPATIENT)
Dept: PHARMACY | Facility: CLINIC | Age: 59
End: 2024-07-13
Payer: MEDICARE

## 2024-08-08 PROCEDURE — RXMED WILLOW AMBULATORY MEDICATION CHARGE

## 2024-08-09 ENCOUNTER — HOSPITAL ENCOUNTER (OUTPATIENT)
Dept: RADIOLOGY | Facility: CLINIC | Age: 59
Discharge: HOME | End: 2024-08-09
Payer: COMMERCIAL

## 2024-08-09 VITALS — BODY MASS INDEX: 35.27 KG/M2 | HEIGHT: 69 IN | WEIGHT: 238.1 LBS

## 2024-08-09 DIAGNOSIS — Z12.31 ENCOUNTER FOR SCREENING MAMMOGRAM FOR MALIGNANT NEOPLASM OF BREAST: ICD-10-CM

## 2024-08-09 PROCEDURE — 77067 SCR MAMMO BI INCL CAD: CPT

## 2024-08-12 ENCOUNTER — PHARMACY VISIT (OUTPATIENT)
Dept: PHARMACY | Facility: CLINIC | Age: 59
End: 2024-08-12
Payer: MEDICARE

## 2024-08-19 DIAGNOSIS — K21.9 GASTROESOPHAGEAL REFLUX DISEASE WITHOUT ESOPHAGITIS: ICD-10-CM

## 2024-08-19 RX ORDER — PANTOPRAZOLE SODIUM 20 MG/1
20 TABLET, DELAYED RELEASE ORAL DAILY
Qty: 30 TABLET | Refills: 1 | Status: SHIPPED | OUTPATIENT
Start: 2024-08-19

## 2024-08-27 DIAGNOSIS — E66.9 OBESITY (BMI 35.0-39.9 WITHOUT COMORBIDITY): ICD-10-CM

## 2024-08-27 RX ORDER — SEMAGLUTIDE 1.7 MG/.75ML
1.7 INJECTION, SOLUTION SUBCUTANEOUS
Qty: 3 ML | Refills: 2 | Status: SHIPPED | OUTPATIENT
Start: 2024-08-27

## 2024-09-06 PROCEDURE — RXMED WILLOW AMBULATORY MEDICATION CHARGE

## 2024-09-10 ENCOUNTER — PHARMACY VISIT (OUTPATIENT)
Dept: PHARMACY | Facility: CLINIC | Age: 59
End: 2024-09-10
Payer: MEDICARE

## 2024-09-18 DIAGNOSIS — G89.29 CHRONIC PAIN OF BOTH KNEES: ICD-10-CM

## 2024-09-18 DIAGNOSIS — M25.561 CHRONIC PAIN OF BOTH KNEES: ICD-10-CM

## 2024-09-18 DIAGNOSIS — M25.562 CHRONIC PAIN OF BOTH KNEES: ICD-10-CM

## 2024-09-18 RX ORDER — MELOXICAM 15 MG/1
7.5-15 TABLET ORAL DAILY PRN
Qty: 60 TABLET | Refills: 0 | Status: SHIPPED | OUTPATIENT
Start: 2024-09-18

## 2024-10-08 ENCOUNTER — LAB (OUTPATIENT)
Dept: LAB | Facility: LAB | Age: 59
End: 2024-10-08
Payer: COMMERCIAL

## 2024-10-08 ENCOUNTER — APPOINTMENT (OUTPATIENT)
Dept: PRIMARY CARE | Facility: CLINIC | Age: 59
End: 2024-10-08
Payer: COMMERCIAL

## 2024-10-08 VITALS
HEIGHT: 68 IN | DIASTOLIC BLOOD PRESSURE: 80 MMHG | SYSTOLIC BLOOD PRESSURE: 124 MMHG | OXYGEN SATURATION: 99 % | HEART RATE: 65 BPM | BODY MASS INDEX: 33.65 KG/M2 | WEIGHT: 222 LBS

## 2024-10-08 DIAGNOSIS — Z12.11 ENCOUNTER FOR SCREENING FOR MALIGNANT NEOPLASM OF COLON: ICD-10-CM

## 2024-10-08 DIAGNOSIS — E78.5 HYPERLIPIDEMIA, UNSPECIFIED HYPERLIPIDEMIA TYPE: ICD-10-CM

## 2024-10-08 DIAGNOSIS — K21.9 GASTROESOPHAGEAL REFLUX DISEASE WITHOUT ESOPHAGITIS: ICD-10-CM

## 2024-10-08 DIAGNOSIS — R20.0 ARM NUMBNESS LEFT: ICD-10-CM

## 2024-10-08 DIAGNOSIS — Z00.00 ANNUAL PHYSICAL EXAM: Primary | ICD-10-CM

## 2024-10-08 DIAGNOSIS — Z23 IMMUNIZATION DUE: ICD-10-CM

## 2024-10-08 DIAGNOSIS — E66.9 OBESITY (BMI 35.0-39.9 WITHOUT COMORBIDITY): ICD-10-CM

## 2024-10-08 DIAGNOSIS — Z00.00 ANNUAL PHYSICAL EXAM: ICD-10-CM

## 2024-10-08 LAB
ALBUMIN SERPL BCP-MCNC: 4.2 G/DL (ref 3.4–5)
ALP SERPL-CCNC: 75 U/L (ref 33–110)
ALT SERPL W P-5'-P-CCNC: 14 U/L (ref 7–45)
ANION GAP SERPL CALC-SCNC: 12 MMOL/L (ref 10–20)
AST SERPL W P-5'-P-CCNC: 14 U/L (ref 9–39)
BILIRUB SERPL-MCNC: 0.5 MG/DL (ref 0–1.2)
BUN SERPL-MCNC: 15 MG/DL (ref 6–23)
CALCIUM SERPL-MCNC: 9.5 MG/DL (ref 8.6–10.6)
CHLORIDE SERPL-SCNC: 102 MMOL/L (ref 98–107)
CHOLEST SERPL-MCNC: 188 MG/DL (ref 0–199)
CHOLESTEROL/HDL RATIO: 2.9
CO2 SERPL-SCNC: 32 MMOL/L (ref 21–32)
CREAT SERPL-MCNC: 0.69 MG/DL (ref 0.5–1.05)
EGFRCR SERPLBLD CKD-EPI 2021: >90 ML/MIN/1.73M*2
ERYTHROCYTE [DISTWIDTH] IN BLOOD BY AUTOMATED COUNT: 13.7 % (ref 11.5–14.5)
EST. AVERAGE GLUCOSE BLD GHB EST-MCNC: 111 MG/DL
GLUCOSE SERPL-MCNC: 76 MG/DL (ref 74–99)
HBA1C MFR BLD: 5.5 %
HCT VFR BLD AUTO: 42.7 % (ref 36–46)
HDLC SERPL-MCNC: 64.3 MG/DL
HGB BLD-MCNC: 13.2 G/DL (ref 12–16)
LDLC SERPL CALC-MCNC: 109 MG/DL
MCH RBC QN AUTO: 29.4 PG (ref 26–34)
MCHC RBC AUTO-ENTMCNC: 30.9 G/DL (ref 32–36)
MCV RBC AUTO: 95 FL (ref 80–100)
NON HDL CHOLESTEROL: 124 MG/DL (ref 0–149)
NRBC BLD-RTO: 0 /100 WBCS (ref 0–0)
PLATELET # BLD AUTO: 289 X10*3/UL (ref 150–450)
POTASSIUM SERPL-SCNC: 4.8 MMOL/L (ref 3.5–5.3)
PROT SERPL-MCNC: 7 G/DL (ref 6.4–8.2)
RBC # BLD AUTO: 4.49 X10*6/UL (ref 4–5.2)
SODIUM SERPL-SCNC: 141 MMOL/L (ref 136–145)
TRIGL SERPL-MCNC: 75 MG/DL (ref 0–149)
TSH SERPL-ACNC: 1.46 MIU/L (ref 0.44–3.98)
VLDL: 15 MG/DL (ref 0–40)
WBC # BLD AUTO: 6.6 X10*3/UL (ref 4.4–11.3)

## 2024-10-08 PROCEDURE — RXMED WILLOW AMBULATORY MEDICATION CHARGE

## 2024-10-08 PROCEDURE — 1036F TOBACCO NON-USER: CPT | Performed by: INTERNAL MEDICINE

## 2024-10-08 PROCEDURE — 99396 PREV VISIT EST AGE 40-64: CPT | Performed by: INTERNAL MEDICINE

## 2024-10-08 PROCEDURE — 80061 LIPID PANEL: CPT

## 2024-10-08 PROCEDURE — 83036 HEMOGLOBIN GLYCOSYLATED A1C: CPT

## 2024-10-08 PROCEDURE — 80053 COMPREHEN METABOLIC PANEL: CPT

## 2024-10-08 PROCEDURE — 3008F BODY MASS INDEX DOCD: CPT | Performed by: INTERNAL MEDICINE

## 2024-10-08 PROCEDURE — 36415 COLL VENOUS BLD VENIPUNCTURE: CPT

## 2024-10-08 PROCEDURE — 84443 ASSAY THYROID STIM HORMONE: CPT

## 2024-10-08 PROCEDURE — 85027 COMPLETE CBC AUTOMATED: CPT

## 2024-10-08 RX ORDER — SEMAGLUTIDE 1.7 MG/.75ML
1.7 INJECTION, SOLUTION SUBCUTANEOUS
Qty: 3 ML | Refills: 3 | Status: SHIPPED | OUTPATIENT
Start: 2024-10-08

## 2024-10-08 RX ORDER — PANTOPRAZOLE SODIUM 40 MG/1
40 TABLET, DELAYED RELEASE ORAL DAILY
Qty: 90 TABLET | Refills: 1 | Status: SHIPPED | OUTPATIENT
Start: 2024-10-08

## 2024-10-08 RX ORDER — SEMAGLUTIDE 1.7 MG/.75ML
1.7 INJECTION, SOLUTION SUBCUTANEOUS
Qty: 3 ML | Refills: 3 | Status: SHIPPED | OUTPATIENT
Start: 2024-10-08 | End: 2024-10-08 | Stop reason: SDUPTHER

## 2024-10-08 ASSESSMENT — PATIENT HEALTH QUESTIONNAIRE - PHQ9
SUM OF ALL RESPONSES TO PHQ9 QUESTIONS 1 AND 2: 0
1. LITTLE INTEREST OR PLEASURE IN DOING THINGS: NOT AT ALL
2. FEELING DOWN, DEPRESSED OR HOPELESS: NOT AT ALL

## 2024-10-08 NOTE — PROGRESS NOTES
"Subjective   Patient ID: Marlena Strickland is a 59 y.o. female who presents for Annual Exam.    HPI   The patient presents for an annual wellness exam.  Reports left arm transient numbness during a specific exercise routine and while sitting at work station in front of a computer screen. The numbness is instantly resolved with postural change and a shaking movement of the arm.     Review of Systems  Intentional weight loss  Arm numbness   Objective   /80   Pulse 65   Ht 1.727 m (5' 8\")   Wt 101 kg (222 lb)   SpO2 99%   BMI 33.75 kg/m²     Physical Exam  NAD. Cooperative.  HEENT: WNL  Neck: WNL  Lungs CTA  Heart: RRR  Abdomen: WNL  Musculoskeletal system: WNL  Neurologic exam: WNL    Assessment/Plan   Diagnoses and all orders for this visit:  Annual physical exam  -     Comprehensive metabolic panel; Future  -     CBC; Future  -     TSH; Future  -     Hemoglobin A1C; Future  -     Lipid panel; Future  Encounter for screening for malignant neoplasm of colon  -     Colonoscopy Screening; Average Risk Patient; Future  Immunization due  Hyperlipidemia, unspecified hyperlipidemia type  -     CT cardiac scoring wo IV contrast; Future  Gastroesophageal reflux disease without esophagitis  -     pantoprazole (ProtoNix) 40 mg EC tablet; Take 1 tablet (40 mg) by mouth once daily. Do not crush, chew, or split.  Obesity (BMI 35.0-39.9 without comorbidity)  -     semaglutide, weight loss, (Wegovy) 1.7 mg/0.75 mL pen injector; Inject 1.7 mg under the skin every 7 days.  Arm numbness left  Avoidance of the motion that provokes the symptoms, ergonomic optimization of work station, self monitoring of the symptom, close phone follow up in 3-4 weeks.  Discussed and recommended Influenza vaccine and COVID booster.  GYN exam update, phone contact for scheduling was provided.     "

## 2024-10-09 ENCOUNTER — PHARMACY VISIT (OUTPATIENT)
Dept: PHARMACY | Facility: CLINIC | Age: 59
End: 2024-10-09
Payer: MEDICARE

## 2024-11-06 PROCEDURE — RXMED WILLOW AMBULATORY MEDICATION CHARGE

## 2024-11-08 ENCOUNTER — PHARMACY VISIT (OUTPATIENT)
Dept: PHARMACY | Facility: CLINIC | Age: 59
End: 2024-11-08
Payer: MEDICARE

## 2024-12-05 PROCEDURE — RXMED WILLOW AMBULATORY MEDICATION CHARGE

## 2024-12-06 ENCOUNTER — PHARMACY VISIT (OUTPATIENT)
Dept: PHARMACY | Facility: CLINIC | Age: 59
End: 2024-12-06
Payer: MEDICARE

## 2024-12-26 PROCEDURE — RXMED WILLOW AMBULATORY MEDICATION CHARGE

## 2024-12-31 ENCOUNTER — PHARMACY VISIT (OUTPATIENT)
Dept: PHARMACY | Facility: CLINIC | Age: 59
End: 2024-12-31
Payer: MEDICARE

## 2025-01-22 ENCOUNTER — HOSPITAL ENCOUNTER (OUTPATIENT)
Dept: RADIOLOGY | Facility: HOSPITAL | Age: 60
Discharge: HOME | End: 2025-01-22
Payer: COMMERCIAL

## 2025-01-22 DIAGNOSIS — E78.5 HYPERLIPIDEMIA, UNSPECIFIED HYPERLIPIDEMIA TYPE: ICD-10-CM

## 2025-01-22 PROCEDURE — 75571 CT HRT W/O DYE W/CA TEST: CPT

## 2025-01-27 DIAGNOSIS — E66.9 OBESITY (BMI 35.0-39.9 WITHOUT COMORBIDITY): Primary | ICD-10-CM

## 2025-01-27 RX ORDER — SEMAGLUTIDE 2.4 MG/.75ML
2.4 INJECTION, SOLUTION SUBCUTANEOUS WEEKLY
Qty: 3 ML | Refills: 2 | Status: SHIPPED | OUTPATIENT
Start: 2025-01-27

## 2025-02-03 DIAGNOSIS — M25.561 CHRONIC PAIN OF BOTH KNEES: ICD-10-CM

## 2025-02-03 DIAGNOSIS — M25.562 CHRONIC PAIN OF BOTH KNEES: ICD-10-CM

## 2025-02-03 DIAGNOSIS — G89.29 CHRONIC PAIN OF BOTH KNEES: ICD-10-CM

## 2025-02-03 RX ORDER — MELOXICAM 15 MG/1
7.5-15 TABLET ORAL DAILY PRN
Qty: 60 TABLET | Refills: 3 | Status: SHIPPED | OUTPATIENT
Start: 2025-02-03

## 2025-05-14 DIAGNOSIS — K21.9 GASTROESOPHAGEAL REFLUX DISEASE WITHOUT ESOPHAGITIS: ICD-10-CM

## 2025-05-15 RX ORDER — PANTOPRAZOLE SODIUM 40 MG/1
40 TABLET, DELAYED RELEASE ORAL DAILY
Qty: 90 TABLET | Refills: 1 | Status: SHIPPED | OUTPATIENT
Start: 2025-05-15

## 2025-08-07 DIAGNOSIS — Z12.11 COLON CANCER SCREENING: Primary | ICD-10-CM

## 2025-08-07 RX ORDER — SOD SULF/POT CHLORIDE/MAG SULF 1.479 G
12 TABLET ORAL 2 TIMES DAILY
Qty: 24 TABLET | Refills: 0 | Status: SHIPPED | OUTPATIENT
Start: 2025-08-07

## 2025-08-08 DIAGNOSIS — Z12.11 SPECIAL SCREENING FOR MALIGNANT NEOPLASMS, COLON: ICD-10-CM

## 2025-08-08 RX ORDER — SODIUM, POTASSIUM,MAG SULFATES 17.5-3.13G
SOLUTION, RECONSTITUTED, ORAL ORAL
Qty: 1 EACH | Refills: 0 | Status: SHIPPED | OUTPATIENT
Start: 2025-08-08

## 2025-09-24 ENCOUNTER — APPOINTMENT (OUTPATIENT)
Dept: GASTROENTEROLOGY | Facility: EXTERNAL LOCATION | Age: 60
End: 2025-09-24
Payer: COMMERCIAL